# Patient Record
Sex: FEMALE | Race: WHITE | Employment: UNEMPLOYED | URBAN - METROPOLITAN AREA
[De-identification: names, ages, dates, MRNs, and addresses within clinical notes are randomized per-mention and may not be internally consistent; named-entity substitution may affect disease eponyms.]

---

## 2024-01-25 ENCOUNTER — EMPLOYEE HEALTH (OUTPATIENT)
Dept: OTHER | Facility: HOSPITAL | Age: 24
End: 2024-01-25
Attending: PREVENTIVE MEDICINE

## 2024-01-25 DIAGNOSIS — Z11.1 SCREENING-PULMONARY TB: Primary | ICD-10-CM

## 2024-01-25 PROCEDURE — 86480 TB TEST CELL IMMUN MEASURE: CPT

## 2024-01-29 LAB
M TB IFN-G CD4+ T-CELLS BLD-ACNC: 0.02 IU/ML
M TB TUBERC IFN-G BLD QL: NEGATIVE
M TB TUBERC IGNF/MITOGEN IGNF CONTROL: >10 IU/ML
QFT TB1 AG MINUS NIL: -0.02 IU/ML
QFT TB2 AG MINUS NIL: -0.02 IU/ML

## 2024-03-27 ENCOUNTER — OFFICE VISIT (OUTPATIENT)
Dept: FAMILY MEDICINE CLINIC | Facility: CLINIC | Age: 24
End: 2024-03-27
Payer: COMMERCIAL

## 2024-03-27 VITALS
TEMPERATURE: 98 F | HEIGHT: 65 IN | DIASTOLIC BLOOD PRESSURE: 62 MMHG | RESPIRATION RATE: 16 BRPM | SYSTOLIC BLOOD PRESSURE: 124 MMHG | OXYGEN SATURATION: 98 % | HEART RATE: 83 BPM | WEIGHT: 152 LBS | BODY MASS INDEX: 25.33 KG/M2

## 2024-03-27 DIAGNOSIS — T78.40XA ALLERGY, INITIAL ENCOUNTER: Primary | ICD-10-CM

## 2024-03-27 PROCEDURE — 99213 OFFICE O/P EST LOW 20 MIN: CPT | Performed by: NURSE PRACTITIONER

## 2024-03-27 NOTE — PROGRESS NOTES
CHIEF COMPLAINT:     Chief Complaint   Patient presents with    Sinus Problem     Runny  nose        HPI:   Clara Amaya is a 23 year old female who presents with c/o stuffy nose since December. Patient denies all symptoms of URI, nasal congestion, coughing, ear pain, HA, fever, or itchy eyes. Patient reports that she did get a new cat in December, which could be contributing to her symptoms. OTC: Claritin, with little to no relief. +Seasonal allergies. Reports having issues usually with [place].       No current outpatient medications on file.      No past medical history on file.   No past surgical history on file.      Social History     Socioeconomic History    Marital status: Single   Tobacco Use    Smoking status: Never    Smokeless tobacco: Never         REVIEW OF SYSTEMS:   GENERAL: unchanged appetite  SKIN: no rashes or abnormal skin lesions  HEENT: See HPI  LUNGS: denies shortness of breath or wheezing, See HPI  CARDIOVASCULAR: denies chest pain or palpitations   GI: denies N/V/C or abdominal pain  NEURO: Denies dizziness or weakness    EXAM:   /62   Pulse 83   Temp 98.3 °F (36.8 °C) (Oral)   Resp 16   Ht 5' 5\" (1.651 m)   Wt 152 lb (68.9 kg)   LMP 03/20/2024 (Approximate)   SpO2 98%   BMI 25.29 kg/m²   GENERAL: well developed, well nourished,in no apparent distress  SKIN: no rashes,no suspicious lesions  HEAD: atraumatic, normocephalic.  Denies tenderness on palpation of maxillary/frontal sinuses  EYES: conjunctiva clear, EOM intact  EARS: TM's pearly/gray, no bulging, no retraction, no fluid, bony landmarks present  NOSE: Nostrils patent, clear nasal discharge, nasal mucosa erythematous   THROAT: Oral mucosa pink, moist. Posterior pharynx is pink. No exudates. Tonsils 1/4.    NECK: Supple, non-tender  LUNGS: clear to auscultation bilaterally, no wheezes or rhonchi. Breathing is non labored.  CARDIO: RRR without murmur  EXTREMITIES: no  cyanosis, clubbing or edema  LYMPH:  No head or neck lymphadenopathy.        ASSESSMENT AND PLAN:   Clara Amaya is a 23 year old female who presents with upper respiratory symptoms that are consistent with    ASSESSMENT:   Encounter Diagnosis   Name Primary?    Allergy, initial encounter Yes         PLAN:     Comfort care per pt instructions.   To follow up for any new or worsening symptoms.   To go to the ER for any severe symptoms such as difficulty breathing or chest pain.     Meds & Refills for this Visit:  Requested Prescriptions      No prescriptions requested or ordered in this encounter       Risks, benefits, and side effects of medication explained and discussed.    Patient Instructions   Start OTC allergy medications, such as Allegra, Zyrtec, Flonase.....  Please follow up with PCP for possible referral to Allergist.     The patient indicates understanding of these issues and agrees to the plan.  The patient is asked to return if sx's persist or worsen.

## 2024-03-27 NOTE — PATIENT INSTRUCTIONS
Start OTC allergy medications, such as Allegra, Zyrtec, Flonase.....  Please follow up with PCP for possible referral to Allergist.

## 2024-05-21 ENCOUNTER — OFFICE VISIT (OUTPATIENT)
Dept: FAMILY MEDICINE CLINIC | Facility: CLINIC | Age: 24
End: 2024-05-21

## 2024-05-21 VITALS
TEMPERATURE: 98 F | HEIGHT: 65 IN | RESPIRATION RATE: 16 BRPM | WEIGHT: 154 LBS | BODY MASS INDEX: 25.66 KG/M2 | OXYGEN SATURATION: 96 % | SYSTOLIC BLOOD PRESSURE: 118 MMHG | DIASTOLIC BLOOD PRESSURE: 60 MMHG | HEART RATE: 78 BPM

## 2024-05-21 DIAGNOSIS — J06.9 VIRAL URI WITH COUGH: Primary | ICD-10-CM

## 2024-05-21 DIAGNOSIS — J30.2 SEASONAL ALLERGIES: ICD-10-CM

## 2024-05-21 PROCEDURE — 99213 OFFICE O/P EST LOW 20 MIN: CPT | Performed by: NURSE PRACTITIONER

## 2024-05-21 NOTE — PROGRESS NOTES
Subjective:   Patient ID: Clara Amaya is a 23 year old female.    Patient is a 23 year old female who presents today with complaints of cough (productive of mucus), chest burning with coughing, headache x yesterday. Has had nasal congestion, runny nose and right ear popping x months which she attributes to allergies. Denies fevers, body aches, chills, sore throat, ear pain, SOB, wheezing, rashes, n/v/d or abdominal pain. Tolerating PO well at home. Attempted treatment prior to arrival = OTC cough medication with relief. Takes antihistamine and uses Flonase daily for alleries. No ill contacts she can identify.    Of note, has an appointment with an allergist in August to discuss ongoing allergy symptoms.        History/Other:   Review of Systems   Constitutional:  Negative for appetite change, chills and fever.   HENT:  Positive for congestion and rhinorrhea. Negative for ear pain and sore throat.    Respiratory:  Positive for cough. Negative for shortness of breath and wheezing.    Gastrointestinal:  Negative for abdominal pain, diarrhea, nausea and vomiting.   Musculoskeletal:  Negative for myalgias.   Skin:  Negative for rash.   Neurological:  Positive for headaches.     No current outpatient medications on file.     Allergies:No Known Allergies    /60   Pulse 78   Temp 98 °F (36.7 °C) (Oral)   Resp 16   Ht 5' 5\" (1.651 m)   Wt 154 lb (69.9 kg)   LMP 05/15/2024 (Approximate)   SpO2 96%   BMI 25.63 kg/m²       Objective:   Physical Exam  Vitals reviewed.   Constitutional:       General: She is awake. She is not in acute distress.     Appearance: Normal appearance. She is well-developed and well-groomed. She is not ill-appearing, toxic-appearing or diaphoretic.   HENT:      Head: Normocephalic and atraumatic.      Right Ear: Ear canal and external ear normal. A middle ear effusion is present.      Left Ear: Tympanic membrane, ear canal and external ear normal.      Nose: Nose normal.       Mouth/Throat:      Lips: Pink.      Mouth: Mucous membranes are moist. No oral lesions.      Pharynx: Oropharynx is clear. Uvula midline.   Cardiovascular:      Rate and Rhythm: Normal rate and regular rhythm.   Pulmonary:      Effort: Pulmonary effort is normal. No respiratory distress.      Breath sounds: Normal breath sounds and air entry. No decreased breath sounds, wheezing, rhonchi or rales.   Lymphadenopathy:      Cervical: No cervical adenopathy.   Skin:     General: Skin is warm and dry.   Neurological:      Mental Status: She is alert and oriented to person, place, and time.   Psychiatric:         Behavior: Behavior is cooperative.         Assessment & Plan:   1. Viral URI with cough    2. Seasonal allergies        No orders of the defined types were placed in this encounter.      Meds This Visit:  Requested Prescriptions      No prescriptions requested or ordered in this encounter     Reassuring physical exam findings. Vitals WNL. No sign of bacterial etiology, RDS or dehydration at this time.  Supportive care and return to care measures reviewed.  Patient v/u and is comfortable with this plan.    Patient Instructions   Tylenol and Motrin as needed  Rest, push fluids  Mucinex DM over the counter for nasal congestion/cough  Continue daily antihistamine and Flonase  Follow up with allergist as scheduled in August  Return to care for new/worsening symptoms

## 2024-05-21 NOTE — PATIENT INSTRUCTIONS
Tylenol and Motrin as needed  Rest, push fluids  Mucinex DM over the counter for nasal congestion/cough  Continue daily antihistamine and Flonase  Follow up with allergist as scheduled in August  Return to care for new/worsening symptoms

## 2024-08-26 ENCOUNTER — OFFICE VISIT (OUTPATIENT)
Dept: ALLERGY | Facility: CLINIC | Age: 24
End: 2024-08-26
Payer: COMMERCIAL

## 2024-08-26 ENCOUNTER — NURSE ONLY (OUTPATIENT)
Dept: ALLERGY | Facility: CLINIC | Age: 24
End: 2024-08-26

## 2024-08-26 VITALS
HEART RATE: 67 BPM | OXYGEN SATURATION: 97 % | BODY MASS INDEX: 26.48 KG/M2 | WEIGHT: 157 LBS | DIASTOLIC BLOOD PRESSURE: 71 MMHG | RESPIRATION RATE: 20 BRPM | SYSTOLIC BLOOD PRESSURE: 108 MMHG | HEIGHT: 64.5 IN

## 2024-08-26 DIAGNOSIS — T50.905A ADVERSE EFFECT OF DRUG, INITIAL ENCOUNTER: ICD-10-CM

## 2024-08-26 DIAGNOSIS — Z88.4: ICD-10-CM

## 2024-08-26 DIAGNOSIS — J30.2 SEASONAL AND PERENNIAL ALLERGIC RHINOCONJUNCTIVITIS: Primary | ICD-10-CM

## 2024-08-26 DIAGNOSIS — J30.89 SEASONAL AND PERENNIAL ALLERGIC RHINOCONJUNCTIVITIS: Primary | ICD-10-CM

## 2024-08-26 DIAGNOSIS — J30.89 ENVIRONMENTAL AND SEASONAL ALLERGIES: Primary | ICD-10-CM

## 2024-08-26 DIAGNOSIS — H10.10 SEASONAL AND PERENNIAL ALLERGIC RHINOCONJUNCTIVITIS: Primary | ICD-10-CM

## 2024-08-26 DIAGNOSIS — Z23 FLU VACCINE NEED: ICD-10-CM

## 2024-08-26 DIAGNOSIS — Z23 NEED FOR COVID-19 VACCINE: ICD-10-CM

## 2024-08-26 PROCEDURE — 95024 IQ TESTS W/ALLERGENIC XTRCS: CPT | Performed by: ALLERGY & IMMUNOLOGY

## 2024-08-26 PROCEDURE — 99204 OFFICE O/P NEW MOD 45 MIN: CPT | Performed by: ALLERGY & IMMUNOLOGY

## 2024-08-26 PROCEDURE — 95018 ALL TSTG PERQ&IQ DRUGS/BIOL: CPT | Performed by: ALLERGY & IMMUNOLOGY

## 2024-08-26 PROCEDURE — 95004 PERQ TESTS W/ALRGNC XTRCS: CPT | Performed by: ALLERGY & IMMUNOLOGY

## 2024-08-26 RX ORDER — AZELASTINE 1 MG/ML
2 SPRAY, METERED NASAL DAILY
Qty: 3 EACH | Refills: 0 | Status: SHIPPED | OUTPATIENT
Start: 2024-08-26

## 2024-08-26 RX ORDER — LEVOCETIRIZINE DIHYDROCHLORIDE 5 MG/1
5 TABLET, FILM COATED ORAL 2 TIMES DAILY
Qty: 180 TABLET | Refills: 1 | Status: SHIPPED | OUTPATIENT
Start: 2024-08-26

## 2024-08-26 NOTE — PATIENT INSTRUCTIONS
Skin testing today to common indoor and outdoor environmental allergies was     Skin testing today to select local anesthetics including lidocaine without epi, mepivacaine without epi, bupivacaine without epi and Carbocaine without epi was negative to all above positive to trees grass ragweed weeds and cat anesthetics tested    Positive histamine control    #1 allergic rhinitis  See above clinical history  Reviewed avoidance measures and potential treatment option immunotherapy  See above skin testing to screen for allergic triggers  Recommend Xyzal, levocetirizine 5 mg once a day up to twice a day if having significant runny nose sneezing itchy watery eyes or hives  Add trial of Astelin nasal spray 2 sprays per nostril twice a day as an antihistamine nasal spray  Consider Singulair/montelukast if refractory    2.  Adverse drug reaction.  Patient reports adverse drug reaction including jaw pain and swelling after a dental procedure in the past.  Skin testing today to local anesthetics including lidocaine bupivacaine mepivacaine and Carbocaine as above.  If testing is negative may consider use of that local anesthetic in the future    #3 flu vaccine in the fall    #4 COVID-vaccine reviewed and recommended.  Reviewed I do not have the vaccine my office.  Please check with local pharmacy.  Less recent booster coming next month               Orders This Visit:  No orders of the defined types were placed in this encounter.      Meds This Visit:  Requested Prescriptions     Signed Prescriptions Disp Refills    levocetirizine 5 MG Oral Tab 180 tablet 1     Sig: Take 1 tablet (5 mg total) by mouth in the morning and 1 tablet (5 mg total) before bedtime.    azelastine 0.1 % Nasal Solution 3 each 0     Si sprays by Nasal route daily.

## 2024-08-26 NOTE — PROGRESS NOTES
Clara Amaya is a 23 year old female.    HPI:     Chief Complaint   Patient presents with    Allergies     Consult.  Patient concerned with itchy, watery, swollen eyes, sneezing and coughing since adopting a cat 12/2023.  Patient offers that she will break out in rash generalized throughout her body. Patient, also, concerned with reaction to local anesthetic at dentist.  Reaction was edema of face and pain in jaw.      Patient is a 23-year-old female who presents for allergy consultation upon referral of her PCP with a chief complaint of allergies.      Allergies   Duration:since Dec 2023   Timing: comes and goes  + spring   Symptoms: itchy, watery, swollen eyes, sneezing and coughing since adopting a cat 12/2023 , rash   Severity: moderate   Status: no change   Triggers: allergies,. Cats   Tried: ronald levinec, xyzal,  ins  No prior Astelin or singulair   Pets or smokers: 2 cats 1 new since 12/2023     Hx of asthma, ad, or food allergy:   [place]wayne: spring  Rn, sz ie we   Zyrtec     Patient reports concern for adverse reaction to local dental anesthetics due to previous symptoms of jaw pain and facial swelling  May 2024 for a filing, no dental work since.  No planned dental work planned  No ed or epi   Tried H1     Dentist provided  Lidocaine 2%   Carbocaine 3%   Articaine 4%         HISTORY:  History reviewed. No pertinent past medical history.   History reviewed. No pertinent surgical history.   Family History   Problem Relation Age of Onset    Heart Disease Mother     Stroke Mother     Stroke Maternal Grandfather     Heart Disease Maternal Grandfather       Social History:   Social History     Socioeconomic History    Marital status: Single   Tobacco Use    Smoking status: Never     Passive exposure: Never    Smokeless tobacco: Never   Substance and Sexual Activity    Alcohol use: Never    Drug use: Never     Social Determinants of Health      Received [facility]         Medications (Active prior to today's visit):  Current Outpatient Medications   Medication Sig Dispense Refill    levocetirizine 5 MG Oral Tab Take 1 tablet (5 mg total) by mouth in the morning and 1 tablet (5 mg total) before bedtime. 180 tablet 1    azelastine 0.1 % Nasal Solution 2 sprays by Nasal route daily. 3 each 0       Allergies:  No Known Allergies      ROS:     Allergic/Immuno:  See HPI  Cardiovascular:  Negative for irregular heartbeat/palpitations, chest pain, edema  Constitutional:  Negative night sweats,weight loss, irritability and lethargy  Endocrine:  Negative for cold intolerance, polydipsia and polyphagia  ENMT:  Negative for ear drainage, hearing loss and nasal drainage. See hpi   Eyes:  Negative for eye discharge and vision loss  Gastrointestinal:  Negative for abdominal pain, diarrhea and vomiting  Genitourinary:  Negative for dysuria and hematuria  Hema/Lymph:  Negative for easy bleeding and easy bruising  Integumentary:  Negative for pruritus and rash  Musculoskeletal:  Negative for joint symptoms  Neurological:  Negative for dizziness, seizures  Psychiatric:  Negative for inappropriate interaction and psychiatric symptoms  Respiratory:  Negative for cough, dyspnea and wheezing      PHYSICAL EXAM:   Constitutional: responsive, no acute distress noted  Head/Face: NC/Atraumatic  Eyes/Vision: conjunctiva and lids are normal extraocular motion is intact   Ears/Audiometry: tympanic membranes are normal bilaterally hearing is grossly intact  Nose/Mouth/Throat: nose and throat are clear mucous membranes are moist   Neck/Thyroid: neck is supple without adenopathy  Lymphatic: no abnormal cervical, supraclavicular or axillary adenopathy is noted  Respiratory: normal to inspection lungs are clear to auscultation bilaterally normal respiratory effort   Cardiovascular: regular rate and rhythm no murmurs, gallups, or rubs  Abdomen: soft non-tender non-distended  Skin/Hair: no unusual rashes  present  Extremities: no edema, cyanosis, or clubbing  Neurological:Oriented to time, place, person & situation       ASSESSMENT/PLAN:   Assessment   Encounter Diagnoses   Name Primary?    Seasonal and perennial allergic rhinoconjunctivitis Yes    Adverse effect of drug, initial encounter     Flu vaccine need     Need for COVID-19 vaccine      Skin testing today to common indoor and outdoor environmental allergies was     Skin testing today to select local anesthetics including lidocaine without epi, mepivacaine without epi, bupivacaine without epi and Carbocaine without epi was negative to all above positive to trees grass ragweed weeds and cat anesthetics tested    Positive histamine control    #1 allergic rhinitis  See above clinical history  Reviewed avoidance measures and potential treatment option immunotherapy  See above skin testing to screen for allergic triggers  Recommend Xyzal, levocetirizine 5 mg once a day up to twice a day if having significant runny nose sneezing itchy watery eyes or hives  Add trial of Astelin nasal spray 2 sprays per nostril twice a day as an antihistamine nasal spray  Consider Singulair/montelukast if refractory    2.  Adverse drug reaction.  Patient reports adverse drug reaction including jaw pain and swelling after a dental procedure in the past.  Skin testing today to local anesthetics including lidocaine bupivacaine mepivacaine and Carbocaine as above.  If testing is negative may consider use of that local anesthetic in the future    #3 flu vaccine in the fall    #4 COVID-vaccine reviewed and recommended.  Reviewed I do not have the vaccine my office.  Please check with local pharmacy.  Less recent booster coming next month               Orders This Visit:  No orders of the defined types were placed in this encounter.      Meds This Visit:  Requested Prescriptions     Signed Prescriptions Disp Refills    levocetirizine 5 MG Oral Tab 180 tablet 1     Sig: Take 1 tablet (5 mg  total) by mouth in the morning and 1 tablet (5 mg total) before bedtime.    azelastine 0.1 % Nasal Solution 3 each 0     Si sprays by Nasal route daily.       Imaging & Referrals:  None     2024  Festus Hope MD      If medication samples were provided today, they were provided solely for patient education and training related to self administration of these medications.  Teaching, instruction and sample was provided to the patient by myself.  Teaching included  a review of potential adverse side effects as well as potential efficacy.  Patient's questions were answered in regards to medication administration and dosing and potential side effects. Teaching was provided via the teach back method